# Patient Record
Sex: FEMALE | Race: BLACK OR AFRICAN AMERICAN
[De-identification: names, ages, dates, MRNs, and addresses within clinical notes are randomized per-mention and may not be internally consistent; named-entity substitution may affect disease eponyms.]

---

## 2019-02-13 ENCOUNTER — HOSPITAL ENCOUNTER (INPATIENT)
Dept: HOSPITAL 91 - REC | Age: 24
LOS: 2 days | Discharge: HOME | DRG: 675 | End: 2019-02-15
Payer: COMMERCIAL

## 2019-02-13 ENCOUNTER — HOSPITAL ENCOUNTER (INPATIENT)
Dept: HOSPITAL 10 - REC | Age: 24
LOS: 2 days | Discharge: HOME | DRG: 675 | End: 2019-02-15
Attending: OBSTETRICS & GYNECOLOGY | Admitting: OBSTETRICS & GYNECOLOGY
Payer: COMMERCIAL

## 2019-02-13 VITALS — DIASTOLIC BLOOD PRESSURE: 66 MMHG | SYSTOLIC BLOOD PRESSURE: 114 MMHG | RESPIRATION RATE: 18 BRPM | HEART RATE: 77 BPM

## 2019-02-13 VITALS — RESPIRATION RATE: 48 BRPM | DIASTOLIC BLOOD PRESSURE: 71 MMHG | HEART RATE: 60 BPM | SYSTOLIC BLOOD PRESSURE: 128 MMHG

## 2019-02-13 VITALS — RESPIRATION RATE: 18 BRPM | HEART RATE: 69 BPM | SYSTOLIC BLOOD PRESSURE: 115 MMHG | DIASTOLIC BLOOD PRESSURE: 69 MMHG

## 2019-02-13 VITALS — SYSTOLIC BLOOD PRESSURE: 137 MMHG | HEART RATE: 80 BPM | RESPIRATION RATE: 16 BRPM | DIASTOLIC BLOOD PRESSURE: 84 MMHG

## 2019-02-13 VITALS — RESPIRATION RATE: 18 BRPM | DIASTOLIC BLOOD PRESSURE: 88 MMHG | SYSTOLIC BLOOD PRESSURE: 127 MMHG | HEART RATE: 62 BPM

## 2019-02-13 VITALS — DIASTOLIC BLOOD PRESSURE: 83 MMHG | SYSTOLIC BLOOD PRESSURE: 128 MMHG | RESPIRATION RATE: 16 BRPM | HEART RATE: 78 BPM

## 2019-02-13 VITALS
BODY MASS INDEX: 28.08 KG/M2 | WEIGHT: 164.46 LBS | BODY MASS INDEX: 28.08 KG/M2 | HEIGHT: 64 IN | BODY MASS INDEX: 28.08 KG/M2 | HEIGHT: 64 IN | WEIGHT: 164.46 LBS

## 2019-02-13 VITALS — DIASTOLIC BLOOD PRESSURE: 79 MMHG | SYSTOLIC BLOOD PRESSURE: 136 MMHG | RESPIRATION RATE: 18 BRPM | HEART RATE: 89 BPM

## 2019-02-13 VITALS — SYSTOLIC BLOOD PRESSURE: 118 MMHG | HEART RATE: 68 BPM | RESPIRATION RATE: 16 BRPM | DIASTOLIC BLOOD PRESSURE: 65 MMHG

## 2019-02-13 VITALS — DIASTOLIC BLOOD PRESSURE: 71 MMHG | RESPIRATION RATE: 14 BRPM | HEART RATE: 76 BPM | SYSTOLIC BLOOD PRESSURE: 121 MMHG

## 2019-02-13 VITALS — HEART RATE: 75 BPM | DIASTOLIC BLOOD PRESSURE: 73 MMHG | SYSTOLIC BLOOD PRESSURE: 119 MMHG | RESPIRATION RATE: 18 BRPM

## 2019-02-13 VITALS — RESPIRATION RATE: 16 BRPM | HEART RATE: 86 BPM | DIASTOLIC BLOOD PRESSURE: 88 MMHG | SYSTOLIC BLOOD PRESSURE: 143 MMHG

## 2019-02-13 VITALS — RESPIRATION RATE: 18 BRPM | HEART RATE: 62 BPM | DIASTOLIC BLOOD PRESSURE: 54 MMHG | SYSTOLIC BLOOD PRESSURE: 95 MMHG

## 2019-02-13 VITALS — RESPIRATION RATE: 18 BRPM | SYSTOLIC BLOOD PRESSURE: 119 MMHG | DIASTOLIC BLOOD PRESSURE: 67 MMHG | HEART RATE: 67 BPM

## 2019-02-13 VITALS — DIASTOLIC BLOOD PRESSURE: 65 MMHG | HEART RATE: 63 BPM | RESPIRATION RATE: 18 BRPM | SYSTOLIC BLOOD PRESSURE: 107 MMHG

## 2019-02-13 VITALS — HEART RATE: 62 BPM | SYSTOLIC BLOOD PRESSURE: 119 MMHG | DIASTOLIC BLOOD PRESSURE: 77 MMHG | RESPIRATION RATE: 17 BRPM

## 2019-02-13 VITALS — RESPIRATION RATE: 18 BRPM | HEART RATE: 68 BPM | SYSTOLIC BLOOD PRESSURE: 118 MMHG | DIASTOLIC BLOOD PRESSURE: 63 MMHG

## 2019-02-13 VITALS — SYSTOLIC BLOOD PRESSURE: 140 MMHG | RESPIRATION RATE: 16 BRPM | DIASTOLIC BLOOD PRESSURE: 84 MMHG | HEART RATE: 82 BPM

## 2019-02-13 VITALS — HEART RATE: 72 BPM | DIASTOLIC BLOOD PRESSURE: 83 MMHG | RESPIRATION RATE: 18 BRPM | SYSTOLIC BLOOD PRESSURE: 138 MMHG

## 2019-02-13 VITALS — RESPIRATION RATE: 18 BRPM | DIASTOLIC BLOOD PRESSURE: 72 MMHG | SYSTOLIC BLOOD PRESSURE: 121 MMHG | HEART RATE: 60 BPM

## 2019-02-13 VITALS — HEART RATE: 79 BPM | DIASTOLIC BLOOD PRESSURE: 63 MMHG | RESPIRATION RATE: 18 BRPM | SYSTOLIC BLOOD PRESSURE: 112 MMHG

## 2019-02-13 VITALS — SYSTOLIC BLOOD PRESSURE: 128 MMHG | DIASTOLIC BLOOD PRESSURE: 88 MMHG | RESPIRATION RATE: 16 BRPM | HEART RATE: 76 BPM

## 2019-02-13 DIAGNOSIS — D27.1: ICD-10-CM

## 2019-02-13 DIAGNOSIS — N80.9: ICD-10-CM

## 2019-02-13 DIAGNOSIS — E28.2: ICD-10-CM

## 2019-02-13 DIAGNOSIS — D27.0: ICD-10-CM

## 2019-02-13 DIAGNOSIS — T83.9XXA: Primary | ICD-10-CM

## 2019-02-13 PROCEDURE — 88300 SURGICAL PATH GROSS: CPT

## 2019-02-13 PROCEDURE — 87086 URINE CULTURE/COLONY COUNT: CPT

## 2019-02-13 PROCEDURE — 86900 BLOOD TYPING SEROLOGIC ABO: CPT

## 2019-02-13 PROCEDURE — 84702 CHORIONIC GONADOTROPIN TEST: CPT

## 2019-02-13 PROCEDURE — 84703 CHORIONIC GONADOTROPIN ASSAY: CPT

## 2019-02-13 PROCEDURE — 0UPD7HZ REMOVAL OF CONTRACEPTIVE DEVICE FROM UTERUS AND CERVIX, VIA NATURAL OR ARTIFICIAL OPENING: ICD-10-PCS

## 2019-02-13 PROCEDURE — 0WJJ0ZZ INSPECTION OF PELVIC CAVITY, OPEN APPROACH: ICD-10-PCS

## 2019-02-13 PROCEDURE — 80053 COMPREHEN METABOLIC PANEL: CPT

## 2019-02-13 PROCEDURE — 88307 TISSUE EXAM BY PATHOLOGIST: CPT

## 2019-02-13 PROCEDURE — 86901 BLOOD TYPING SEROLOGIC RH(D): CPT

## 2019-02-13 PROCEDURE — 85025 COMPLETE CBC W/AUTO DIFF WBC: CPT

## 2019-02-13 PROCEDURE — 0UB20ZZ EXCISION OF BILATERAL OVARIES, OPEN APPROACH: ICD-10-PCS

## 2019-02-13 PROCEDURE — 0UPD7HZ REMOVAL OF CONTRACEPTIVE DEVICE FROM UTERUS AND CERVIX, VIA NATURAL OR ARTIFICIAL OPENING: ICD-10-PCS | Performed by: OBSTETRICS & GYNECOLOGY

## 2019-02-13 PROCEDURE — 0UB20ZZ EXCISION OF BILATERAL OVARIES, OPEN APPROACH: ICD-10-PCS | Performed by: OBSTETRICS & GYNECOLOGY

## 2019-02-13 PROCEDURE — 88305 TISSUE EXAM BY PATHOLOGIST: CPT

## 2019-02-13 PROCEDURE — 86850 RBC ANTIBODY SCREEN: CPT

## 2019-02-13 PROCEDURE — 0WJJ0ZZ INSPECTION OF PELVIC CAVITY, OPEN APPROACH: ICD-10-PCS | Performed by: OBSTETRICS & GYNECOLOGY

## 2019-02-13 RX ADMIN — PYRIDOXINE HYDROCHLORIDE 1 MLS/HR: 100 INJECTION, SOLUTION INTRAMUSCULAR; INTRAVENOUS at 18:28

## 2019-02-13 RX ADMIN — PYRIDOXINE HYDROCHLORIDE 1 MLS/HR: 100 INJECTION, SOLUTION INTRAMUSCULAR; INTRAVENOUS at 12:25

## 2019-02-13 RX ADMIN — PYRIDOXINE HYDROCHLORIDE SCH MLS/HR: 100 INJECTION, SOLUTION INTRAMUSCULAR; INTRAVENOUS at 12:25

## 2019-02-13 RX ADMIN — MEPERIDINE HYDROCHLORIDE 1 MG: 25 INJECTION, SOLUTION INTRAMUSCULAR; INTRAVENOUS; SUBCUTANEOUS at 09:46

## 2019-02-13 RX ADMIN — ONDANSETRON HYDROCHLORIDE 1 MG: 2 INJECTION, SOLUTION INTRAMUSCULAR; INTRAVENOUS at 09:46

## 2019-02-13 RX ADMIN — PYRIDOXINE HYDROCHLORIDE SCH MLS/HR: 100 INJECTION, SOLUTION INTRAMUSCULAR; INTRAVENOUS at 18:28

## 2019-02-13 NOTE — SIPON
Date/Time of Note


Date/Time of Note


DATE: 2/13/19 


TIME: 09:14





Operative Report


Preoperative Diagnosis


RIGHT OVARIAN CYST PELVIC PAIN MISPLACED IUD


Postoperative Diagnosis


RIGHT AND LEFT OVARIAN CYST PELVIC PAIN MISPLACED IUD PCO


Operation/Procedure Performed


EXPLORATORY LAPAROTOMY IUD REMOVAL BILATERAL OVARIAN CYSTECTOMY DRILLING OF BOTH


OVARIES


Surgeon


see signature line


First assist


DR WALL


Anesthesia:  general


Estimated blood loss:  50 - 100 ml's


Transfusion Required


   none


Specimen


RIGHT AND LEFT OVARIAN CYST DERMOID CYST IUD PARAGARD


Grafts/Implants


none


Complications


none











JERI SAUNDERS MD             Feb 13, 2019 09:17

## 2019-02-13 NOTE — PREOPHP
DATE OF ADMISSION: 2019

 

HISTORY OF PRESENT ILLNESS:  This is a 23-year-old lady,  0.  Her last normal menstrual period
 was a few days prior to admission.  She was admitted for a dilation and curettage, hysteroscopy, IUD
 removal, exploratory laparotomy, ovarian cystectomy, possible oophorectomy.  This patient is known t
o have a 15 x 15 cm cyst on the right ovary and she is having chronic pelvic pain.  She also has an I
UD that could not be removed in the clinic, so she wanted the IUD to be removed.  The procedures were
 explained to the patient and she understood everything totally.  The risks, benefits and alternative
s were discussed with her as well.

 

PAST PERSONAL HISTORY:  No history of diabetes, tuberculosis, asthma.

 

ALLERGIES:  NONE.

 

SOCIAL HISTORY:  The patient does not smoke.  She does not drink.  She does not take any drugs.

 

GYNECOLOGIC HISTORY:  She had menarche at the age of 11, every 28 days interval, 3 to 4 days' duratio
n, and moderate in amount.  She is  0.

 

FAMILY HISTORY:  Uncle and grandmother on mother's side have high blood pressure and diabetes.

 

REVIEW OF SYSTEMS:

CARDIOVASCULAR:  No chest pains.

RESPIRATORY:  No cough.

GASTROINTESTINAL:  No diarrhea, no vomiting.

GENITOURINARY:  No dysuria.

 

PHYSICAL EXAMINATION:

GENERAL:  Reveals a conscious, coherent lady and in no acute distress.

VITAL SIGNS:  Her blood pressure 120/80, pulse rate 80 per minute, respirations 16 per minute.

BREASTS, HEART AND LUNGS:  Within normal limits.

ABDOMEN:  Soft.  No organomegaly.

PELVIC:  Revealed the cervix to be firm and pelvic mass about size of 15 cm was noted.

RECTAL:  Confirmed the pelvic findings.

EXTREMITIES:  No pedal edema.  IUD strings could not be visualized.

 

ADMITTING DIAGNOSES:  Misplaced IUD and right ovarian cyst, possible endometriosis and chronic pelvic
 pain.

 

PLAN:  The patient was planned to have dilation and curettage, hysteroscopy, IUD removal, exploratory
 laparotomy, ovarian cystectomy, possible oophorectomy, frozen section.  Procedures were explained to
 the patient and she understood everything totally.  The risks, benefits and alternatives were discus
sed with her as well.

 

 

Dictated By: JERI NG/JESSENIA

DD:    2019 05:46:47

DT:    2019 08:03:19

Conf#: [id number]

DID#:  [id number]

CC: JERI SAUNDERS MD;*[facility]*

## 2019-02-13 NOTE — OPR
DATE OF OPERATION:  02/13/2019

 

 

PREOPERATIVE DIAGNOSES:  

1.  Misplaced intrauterine device.

2.  Right ovarian cyst.

3.  Chronic pelvic pain.

 

POSTOPERATIVE DIAGNOSES:  

1.  Misplaced intrauterine device.

2.  Right ovarian cyst.

3.  Chronic pelvic pain.  

4.  Polycystic ovaries.

 

SURGEON:  Jasmina Madden MD

 

ASSISTANT:  Dr. Wright.

 

ANESTHESIA:  General.

 

OPERATION PERFORMED:  IUD removal.  Pelvic exam under anesthesia and exploratory laparotomy, right ov
juliette cystectomy, left ovarian cystectomy and drilling of both ovaries.

 

OPERATIVE TECHNIQUE:  Under general anesthesia, the patient was prepped and draped in the usual fashi
on for vaginal surgery.  Pelvic exam under anesthesia revealed the cervix to be firm, and uterus coul
d not be properly delineated.  There is a huge pelvic mass noted measuring up to about 20 cm.  Then, 
the heavy weight vaginal retractor was put in place and the anterior lip of the cervix was grasped wi
th an Allis clamp.  The long Kaci was inserted in the endocervical canal and grasped the IUD string 
and with good grasping, the IUD was removed.  Paragard was noted and it was removed and intact and pi
cture was taken.  The patient's legs were put down and the patient was prepped and draped in the usua
l fashion for abdominal surgery.  Then, a Pfannenstiel incision about 14 cm skin incision was perform
ed.  The incision was carried from the skin up to the fascia.  Upon opening the skin up to the fascia
, small blood vessels were noted to be oozing and these were all cauterized.  Fascia was opened trans
versely followed by splitting the muscles vertical and the peritoneum vertically.  Then, the muscles 
on the lower abdomen were cut transversely for pelvic mass to be taken out from the pelvis.  Then the
 pelvic mass was taken out from the pelvis.  Then it was noted to be the right ovary measuring about 
20 x 20 cm.  Picture was taken.  Then the left ovary was noted to be about 4 cm with a small cyst as 
well.  Then the serosa of the ovary, incision was performed at the serosa of the ovary above the cyst
.  When the cyst was almost jail , the cyst ruptured.  Clear yellowish fluid was noted.  
Then 20 mL of fluid was sent to pathology and the ovarian cyst was  from the normal ovary by
 sharp and blunt dissection.  The base of the cyst was noted to have hair.  So this was a dermoid cys
t and confirmed by the pathologist.  Pathologist came and said that this was a benign dermoid cyst, s
o that the remaining ovarian tissue was sutured with a pursestring suture, about 10 layers were put i
n.  There was no bleeding noted at the remaining ovarian tissue.  The base of this ovary as well had 
a small tiny cyst, polycystic ovaries were noted and these were tiny cysts were drilled.  Then, the u
terus was noted to be of normal size.  The left ovary; there was a 4 cm cyst and it was enucleated.  
The remaining ovarian tissue was cauterized.  Bleeders were checked, and there was no bleeding noted.
  Multiple follicular cysts were noted.  Polycystic ovary was noted on the left ovary.  Drilling of t
he left ovary was done.  The back of the uterus was checked for any endometriosis and there was none 
noted.  Both of the ovaries were noted to have no bleeding.  Both tubes were also noted to be healthy
 looking.  Both fimbria were identified.  Irrigation was done with about 200 mL of normal saline.  Af
ter irrigation both ovaries were checked for any bleeders and there was no bleeding noted.  Then, the
 uterus was put back to the pelvic cavity.  Once again, both ovaries were checked for any bleeders an
d there was no bleeding noted.  After correct sponge count, needle count, and instrument count, both 
the muscle and peritoneum on both sides of the lower abdomen were closed by continuous suture.  Then,
 the peritoneum was closed with continuous suture with 0 Vicryl.  Bleeders were checked, and there wa
s no bleeding noted.  Then this was done after correct sponge count, needle count and instrument coun
t as confirmed by the scrub tech and circulator, then the fascia was closed with continuous suture fo
llowed by few figure-of-eight sutures for the subcutaneous tissue.  It was closed with 3-0 Vicryl and
 the skin was closed with 3-0 Vicryl, subcuticular suture was used.  The patient tolerated the proced
ure well.  Estimated blood loss was about 100 mL.  Vital signs were stable during and after the proce
dure.

 

 

Dictated By: JASMINA MADDEN MD

 

NS/NTS

DD:    02/13/2019 14:45:05

DT:    02/13/2019 15:13:42

Conf#: [id number]

DID#:  [id number]

CC: JASMINA MADDEN MD;*[facility]*

## 2019-02-13 NOTE — PAC
Date/Time of Note


Date/Time of Note


DATE: 2/13/19 


TIME: 09:23





Post-Anesthesia Notes


Post-Anesthesia Note


Last documented vital signs





Vital Signs


  Date      Temp  Pulse  Resp  B/P (MAP)   Pulse Ox  O2          O2 Flow    FiO2


Time                                                 Delivery    Rate


   2/13/19  98.1     77    18      114/66       100  Room Air


     07:11                           (82)





Activity:  WNL


Respiratory function:  WNL


Cardiovascular function:  WNL


Mental status:  Baseline


Pain reasonably controlled:  Yes


Hydration appropriate:  Yes


Nausea/Vomiting absent:  Yes


Comments


BP: 138/83


HR: 90


RR: 15


T: 98


SaO2: 100%











CARMEN REYNOLDS MD                Feb 13, 2019 09:23

## 2019-02-13 NOTE — PREAC
Date/Time of Note


Date/Time of Note


DATE: 2/13/19 


TIME: 07:17





Anesthesia Eval and Record


Evaluation


Time Pre-Procedure Interview


DATE: 2/13/19 


TIME: 07:17


Age


23


Sex


female


NPO:  8 hrs


Preoperative diagnosis


ovarian cyst


Planned procedure


exploratory laparotomy, ovarian cystectomy, possible oophorectomy, IUD removal, 


frozen section





Past Medical History


Past Medical History:  None





Surgery & Anesthesia Issues


No known issue





Meds


Anticoagulation:  No


Beta Blocker within 24 hr:  No


Reason Beta Blocker not given:  Pt. not on B-Blocker


Reported Medications


Ferrous Sulfate* (Ferrous Sulfate*) 325 Mg Tabec, 325 MG PO DAILY, TAB


   2/13/19


Discontinued Reported Medications


[Iron]   No Conflict Check


   2/11/19


Meds reviewed:  Yes





Allergies


Coded Allergies:  


     No Known Allergy (Unverified , 2/13/19)


Allergies Reviewed:  Yes





Labs/Studies


Labs Reviewed:  Reviewed by anesthesiologist


Pregnancy test:  Negative





Pre-procedure Exam


Last vitals





Vital Signs


  Date      Temp  Pulse  Resp  B/P (MAP)   Pulse Ox  O2          O2 Flow    FiO2


Time                                                 Delivery    Rate


   2/13/19  98.1     77    18      114/66       100  Room Air


     07:11                           (82)





Airway:  Adequate mouth opening, Adequate thyromental dist


Mallampati:  Mallampati II


Teeth:  Normal


Lung:  Normal


Heart:  Normal





ASA Physical Status


ASA physical status:  1


Emergency:  None





Planned Anesthetic


General/MAC:  ETT





Planned Pain Management


Parenteral pain med





Pre-operative Attestations


Prior to commencing anesthesia and surgery, the patient was re-evaluated, there 


was verification of:


*The patient's identity


*The results of appropriate recent lab work and preoperative vital signs


*The above evaluation not changing prior to induction


*Anesthetic plan, risk benefits, alternative and complications discussed with 


patient/family; questions answered; patient/family understands, accepts and 


wishes to proceed.











CARMEN REYNOLDS MD                Feb 13, 2019 07:19

## 2019-02-14 VITALS — DIASTOLIC BLOOD PRESSURE: 57 MMHG | SYSTOLIC BLOOD PRESSURE: 109 MMHG | RESPIRATION RATE: 18 BRPM | HEART RATE: 64 BPM

## 2019-02-14 VITALS — DIASTOLIC BLOOD PRESSURE: 68 MMHG | SYSTOLIC BLOOD PRESSURE: 117 MMHG | RESPIRATION RATE: 18 BRPM | HEART RATE: 87 BPM

## 2019-02-14 LAB
ADD MAN DIFF?: NO
ALANINE AMINOTRANSFERASE: 18 IU/L (ref 13–69)
ALBUMIN/GLOBULIN RATIO: 1.19
ALBUMIN: 3.7 G/DL (ref 3.3–4.9)
ALKALINE PHOSPHATASE: 39 IU/L (ref 42–121)
ANION GAP: 9 (ref 5–13)
ASPARTATE AMINO TRANSFERASE: 45 IU/L (ref 15–46)
BASOPHIL #: 0 10^3/UL (ref 0–0.1)
BASOPHILS %: 0.2 % (ref 0–2)
BILIRUBIN,DIRECT: 0 MG/DL (ref 0–0.2)
BILIRUBIN,TOTAL: 0.5 MG/DL (ref 0.2–1.3)
BLOOD UREA NITROGEN: 9 MG/DL (ref 7–20)
CALCIUM: 9.1 MG/DL (ref 8.4–10.2)
CARBON DIOXIDE: 26 MMOL/L (ref 21–31)
CHLORIDE: 104 MMOL/L (ref 97–110)
CREATININE: 0.69 MG/DL (ref 0.44–1)
EOSINOPHILS #: 0 10^3/UL (ref 0–0.5)
EOSINOPHILS %: 0 % (ref 0–7)
GLOBULIN: 3.1 G/DL (ref 1.3–3.2)
GLUCOSE: 91 MG/DL (ref 70–220)
HEMATOCRIT: 34.2 % (ref 37–47)
HEMOGLOBIN: 10.7 G/DL (ref 12–16)
IMMATURE GRANS #M: 0.03 10^3/UL (ref 0–0.03)
IMMATURE GRANS % (M): 0.3 % (ref 0–0.43)
LYMPHOCYTES #: 2 10^3/UL (ref 0.8–2.9)
LYMPHOCYTES %: 18.2 % (ref 15–51)
MEAN CORPUSCULAR HEMOGLOBIN: 26 PG (ref 29–33)
MEAN CORPUSCULAR HGB CONC: 31.3 G/DL (ref 32–37)
MEAN CORPUSCULAR VOLUME: 83.2 FL (ref 82–101)
MEAN PLATELET VOLUME: 12.1 FL (ref 7.4–10.4)
MONOCYTE #: 1.4 10^3/UL (ref 0.3–0.9)
MONOCYTES %: 12.2 % (ref 0–11)
NEUTROPHIL #: 7.7 10^3/UL (ref 1.6–7.5)
NEUTROPHILS %: 69.1 % (ref 39–77)
NUCLEATED RED BLOOD CELLS #: 0 10^3/UL (ref 0–0)
NUCLEATED RED BLOOD CELLS%: 0 /100WBC (ref 0–0)
PLATELET COUNT: 231 10^3/UL (ref 140–415)
POTASSIUM: 3.9 MMOL/L (ref 3.5–5.1)
RED BLOOD COUNT: 4.11 10^6/UL (ref 4.2–5.4)
RED CELL DISTRIBUTION WIDTH: 12.9 % (ref 11.5–14.5)
SODIUM: 139 MMOL/L (ref 135–144)
TOTAL PROTEIN: 6.8 G/DL (ref 6.1–8.1)
WHITE BLOOD COUNT: 11.2 10^3/UL (ref 4.8–10.8)

## 2019-02-14 RX ADMIN — PYRIDOXINE HYDROCHLORIDE 1 MLS/HR: 100 INJECTION, SOLUTION INTRAMUSCULAR; INTRAVENOUS at 02:53

## 2019-02-14 RX ADMIN — Medication 1 MG: at 10:03

## 2019-02-14 RX ADMIN — PYRIDOXINE HYDROCHLORIDE SCH MLS/HR: 100 INJECTION, SOLUTION INTRAMUSCULAR; INTRAVENOUS at 02:53

## 2019-02-14 RX ADMIN — MAGNESIUM HYDROXIDE 1 ML: 400 SUSPENSION ORAL at 10:02

## 2019-02-14 RX ADMIN — PYRIDOXINE HYDROCHLORIDE 1 MLS/HR: 100 INJECTION, SOLUTION INTRAMUSCULAR; INTRAVENOUS at 18:25

## 2019-02-14 RX ADMIN — IBUPROFEN 1 MG: 800 TABLET, FILM COATED ORAL at 15:38

## 2019-02-14 RX ADMIN — MAGNESIUM HYDROXIDE 1 ML: 400 SUSPENSION ORAL at 06:05

## 2019-02-14 RX ADMIN — IBUPROFEN PRN MG: 800 TABLET ORAL at 09:16

## 2019-02-14 RX ADMIN — Medication 1 MG: at 06:06

## 2019-02-14 RX ADMIN — PYRIDOXINE HYDROCHLORIDE SCH MLS/HR: 100 INJECTION, SOLUTION INTRAMUSCULAR; INTRAVENOUS at 11:02

## 2019-02-14 RX ADMIN — IBUPROFEN 1 MG: 800 TABLET, FILM COATED ORAL at 09:16

## 2019-02-14 RX ADMIN — Medication 1 MG: at 03:31

## 2019-02-14 RX ADMIN — PYRIDOXINE HYDROCHLORIDE 1 MLS/HR: 100 INJECTION, SOLUTION INTRAMUSCULAR; INTRAVENOUS at 11:02

## 2019-02-14 RX ADMIN — PYRIDOXINE HYDROCHLORIDE SCH MLS/HR: 100 INJECTION, SOLUTION INTRAMUSCULAR; INTRAVENOUS at 18:25

## 2019-02-14 RX ADMIN — IBUPROFEN PRN MG: 800 TABLET ORAL at 15:38

## 2019-02-14 RX ADMIN — HYDROCODONE BITARTRATE AND ACETAMINOPHEN 1 TAB: 5; 325 TABLET ORAL at 06:14

## 2019-02-14 RX ADMIN — HYDROCODONE BITARTRATE AND ACETAMINOPHEN PRN TAB: 5; 325 TABLET ORAL at 06:14

## 2019-02-14 NOTE — PN
DATE:  02/14/2019

 

 

TIME:  5:30 p.m.

 

SUBJECTIVE:  The patient feels good complaining moderate incisional pain.  She had good bowel movemen
t and she had good gas per rectum.

 

OBJECTIVE:

VITAL SIGNS:  She is afebrile.

CHEST:  Lungs clear.

HEART:  Normal sinus rhythm.

ABDOMEN:  Soft.  Wound dry and clean.  No distention.

EXTREMITIES:  No calf tenderness.

 

ASSESSMENT:  Postop day #1.

 

PLAN:  CBC tomorrow.  Discontinue IV in the morning.  Advance diet as tolerated.  She will go home to
[place].  The patient is counseled.  She was instructed.  A prescription was given to her for pain.

 

 

Dictated By: JERI NG/JESSENIA

DD:    02/14/2019 20:48:12

DT:    02/14/2019 21:27:19

Conf#: [id number]

DID#:  [id number]

## 2019-02-15 VITALS — DIASTOLIC BLOOD PRESSURE: 62 MMHG | HEART RATE: 82 BPM | RESPIRATION RATE: 19 BRPM | SYSTOLIC BLOOD PRESSURE: 121 MMHG

## 2019-02-15 VITALS — RESPIRATION RATE: 18 BRPM | DIASTOLIC BLOOD PRESSURE: 84 MMHG | HEART RATE: 69 BPM | SYSTOLIC BLOOD PRESSURE: 127 MMHG

## 2019-02-15 VITALS — DIASTOLIC BLOOD PRESSURE: 68 MMHG | HEART RATE: 72 BPM | SYSTOLIC BLOOD PRESSURE: 115 MMHG | RESPIRATION RATE: 19 BRPM

## 2019-02-15 LAB
ADD MAN DIFF?: NO
BASOPHIL #: 0 10^3/UL (ref 0–0.1)
BASOPHILS %: 0.3 % (ref 0–2)
EOSINOPHILS #: 0 10^3/UL (ref 0–0.5)
EOSINOPHILS %: 0.6 % (ref 0–7)
HEMATOCRIT: 34 % (ref 37–47)
HEMOGLOBIN: 10.5 G/DL (ref 12–16)
IMMATURE GRANS #M: 0.03 10^3/UL (ref 0–0.03)
IMMATURE GRANS % (M): 0.4 % (ref 0–0.43)
LYMPHOCYTES #: 2.6 10^3/UL (ref 0.8–2.9)
LYMPHOCYTES %: 38.3 % (ref 15–51)
MEAN CORPUSCULAR HEMOGLOBIN: 26.4 PG (ref 29–33)
MEAN CORPUSCULAR HGB CONC: 30.9 G/DL (ref 32–37)
MEAN CORPUSCULAR VOLUME: 85.4 FL (ref 82–101)
MEAN PLATELET VOLUME: 12 FL (ref 7.4–10.4)
MONOCYTE #: 0.9 10^3/UL (ref 0.3–0.9)
MONOCYTES %: 12.6 % (ref 0–11)
NEUTROPHIL #: 3.3 10^3/UL (ref 1.6–7.5)
NEUTROPHILS %: 47.8 % (ref 39–77)
NUCLEATED RED BLOOD CELLS #: 0 10^3/UL (ref 0–0)
NUCLEATED RED BLOOD CELLS%: 0 /100WBC (ref 0–0)
PLATELET COUNT: 207 10^3/UL (ref 140–415)
RED BLOOD COUNT: 3.98 10^6/UL (ref 4.2–5.4)
RED CELL DISTRIBUTION WIDTH: 12.9 % (ref 11.5–14.5)
WHITE BLOOD COUNT: 6.8 10^3/UL (ref 4.8–10.8)

## 2019-02-15 RX ADMIN — IBUPROFEN PRN MG: 800 TABLET ORAL at 13:05

## 2019-02-15 RX ADMIN — PYRIDOXINE HYDROCHLORIDE 1 MLS/HR: 100 INJECTION, SOLUTION INTRAMUSCULAR; INTRAVENOUS at 01:46

## 2019-02-15 RX ADMIN — HYDROCODONE BITARTRATE AND ACETAMINOPHEN PRN TAB: 5; 325 TABLET ORAL at 04:58

## 2019-02-15 RX ADMIN — HYDROCODONE BITARTRATE AND ACETAMINOPHEN 1 TAB: 5; 325 TABLET ORAL at 04:58

## 2019-02-15 RX ADMIN — PYRIDOXINE HYDROCHLORIDE SCH MLS/HR: 100 INJECTION, SOLUTION INTRAMUSCULAR; INTRAVENOUS at 04:00

## 2019-02-15 RX ADMIN — PYRIDOXINE HYDROCHLORIDE 1 MLS/HR: 100 INJECTION, SOLUTION INTRAMUSCULAR; INTRAVENOUS at 04:00

## 2019-02-15 RX ADMIN — IBUPROFEN 1 MG: 800 TABLET, FILM COATED ORAL at 13:05

## 2019-02-15 RX ADMIN — PYRIDOXINE HYDROCHLORIDE SCH MLS/HR: 100 INJECTION, SOLUTION INTRAMUSCULAR; INTRAVENOUS at 01:46
